# Patient Record
Sex: FEMALE | Race: WHITE | Employment: FULL TIME | URBAN - METROPOLITAN AREA
[De-identification: names, ages, dates, MRNs, and addresses within clinical notes are randomized per-mention and may not be internally consistent; named-entity substitution may affect disease eponyms.]

---

## 2018-07-05 ENCOUNTER — OFFICE VISIT (OUTPATIENT)
Dept: DERMATOLOGY | Age: 38
End: 2018-07-05

## 2018-07-05 DIAGNOSIS — L68.9 EXCESSIVE HAIR: Primary | ICD-10-CM

## 2018-07-05 DIAGNOSIS — Z41.1 ELECTIVE PROCEDURE FOR UNACCEPTABLE COSMETIC APPEARANCE: ICD-10-CM

## 2018-07-05 PROCEDURE — DM01500 PIGMENTED LASER 1 LESION: Performed by: DERMATOLOGY

## 2018-07-05 PROCEDURE — DM01530 PIGMENTED LASER LARGE AREA (EG. CHEST, ARMS, NECK, SHOULDERS): Performed by: DERMATOLOGY

## 2018-07-05 RX ORDER — DICLOFENAC SODIUM 75 MG/1
TABLET, DELAYED RELEASE ORAL
COMMUNITY
Start: 2018-06-28 | End: 2019-08-01

## 2018-07-05 NOTE — PROGRESS NOTES
[facility]  Manju Olivo MD  [phone]      Wilton Hicks  1980    40 y.o. female     Date of Visit: 7/5/2018    Last seen: new; mom is a patient - Peri Ramírez    Chief Complaint: hair removal  Chief Complaint   Patient presents with    Laser Treatment     NP-bikini, axillas and chin     History of Present Illness:    Here for elective hair removal in the axilla, bikini and chin. No previous trx. Typically shaves; no recent pluxking or waxing. Not tan. 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 Past Medical History, Family History, Surgical History, Medications and Allergies reviewed. Outpatient Prescriptions Marked as Taking for the 7/5/18 encounter (Office Visit) with Heidi Griffin MD   Medication Sig Dispense Refill    diclofenac (VOLTAREN) 75 MG EC tablet        Allergies   Allergen Reactions    Sulfa Antibiotics Other (See Comments)     Patient states sulfer and sulfa, as an infant caused her to turn blue       Past Medical History:   Diagnosis Date    Pyloric stenosis 1982     History reviewed. No pertinent surgical history. Physical Examination     Gen, well-appearing  Axilla, bikini and chin with dark brown hairs    Assessment and Plan     1.  Excess hair - chin, bikini and axilla  - laser as below  Laser Procedure Note       Wyn Blinks OF BIRTH:  1980    DATE OF VISIT:  7/5/2018  Chief Complaint   Patient presents with    Laser Treatment     NP-bikini, axillas and chin     LASER: GentleLase #1  DIAGNOSIS: excess hair - bikini, axilla, chin    We discussed treatment options, including no treatment as well as the following:  - need for multiple treatments and risk of incomplete clearance, recurrence  - risk of erythema, edema, purpura, dyspigmentation and scarring    PATIENT IDENTIFIED PER PROTOCOL: yes  LOCATION(S): axilla, bikini, chin  VERIFIED AND MARKED: yes  TECHNIQUES, RISKS, BENEFITS AND ALTERNATIVES EXPLAINED: yes  CONSENT SIGNED, WITNESSED AND DATED: yes        OPERATIVE REPORT    DIAGNOSIS, LOCATION, PROCEDURE RECONFIRMED: yes   EYE PROTECTION: yes  ANESTHESIA/PRE-OP MEDICATIONS: none  LASER SETTINGS:  (1)  WAVELENGTH: 755 - GentleLase  LENS: 18 mm  FLUENCE: 18 J  COOLIN/40    PROCEDURE NOTE:  Affected areas treated with single and focal double pulses with vaporization of hairs and mild erythema.     POST-OPERATIVE CARE/DISPOSITION: cool compresses prn  COMPLICATIONS: none  MEDICATIONS: none  WOUND CARE INSTRUCTIONS PROVIDED: yes    F/u 1-2 mos        200 - azilla  200 - bikini  100 - chin    500 total

## 2018-07-18 ENCOUNTER — TELEPHONE (OUTPATIENT)
Dept: DERMATOLOGY | Age: 38
End: 2018-07-18

## 2018-07-18 NOTE — TELEPHONE ENCOUNTER
Lee Rojas was in on 7/5 for laser hair removal. She has a few questions on when to follow up. Should she do every month or wait for more hair growth? You can reach her at [phone].

## 2018-07-24 ENCOUNTER — TELEPHONE (OUTPATIENT)
Dept: DERMATOLOGY | Age: 38
End: 2018-07-24

## 2018-09-06 ENCOUNTER — PROCEDURE VISIT (OUTPATIENT)
Dept: DERMATOLOGY | Age: 38
End: 2018-09-06

## 2018-09-06 DIAGNOSIS — Z41.1 ELECTIVE PROCEDURE FOR UNACCEPTABLE COSMETIC APPEARANCE: ICD-10-CM

## 2018-09-06 DIAGNOSIS — L68.9 EXCESSIVE HAIR: Primary | ICD-10-CM

## 2018-09-06 PROCEDURE — DM01320 VBEAM LASER LARGE OR 4 AREAS/ FULL FACE ROSACEA: Performed by: DERMATOLOGY

## 2018-09-06 PROCEDURE — DM01310 VBEAM LASER SMALL OR 2 AREAS: Performed by: DERMATOLOGY

## 2018-11-01 ENCOUNTER — PROCEDURE VISIT (OUTPATIENT)
Dept: DERMATOLOGY | Age: 38
End: 2018-11-01

## 2018-11-01 DIAGNOSIS — Z41.1 ELECTIVE PROCEDURE FOR UNACCEPTABLE COSMETIC APPEARANCE: ICD-10-CM

## 2018-11-01 DIAGNOSIS — L68.9 EXCESSIVE HAIR: Primary | ICD-10-CM

## 2018-11-01 PROCEDURE — DM01300 VBEAM LASER EXTRA SMALL AREA: Performed by: DERMATOLOGY

## 2018-11-01 PROCEDURE — DM01320 VBEAM LASER LARGE OR 4 AREAS/ FULL FACE ROSACEA: Performed by: DERMATOLOGY

## 2018-11-01 NOTE — PROGRESS NOTES
[facility]  Yamile Das MD  [phone]      Servando Boyce  1980    40 y.o. female     Date of Visit: 11/1/2018    Last seen: 9-2018; mom is a patient - Becky De La Vega    Chief Complaint: hair removal  Chief Complaint   Patient presents with   [address] area and both underarms     History of Present Illness:    Here for elective hair removal in the axilla, bikini. Chin also treated x 2 but minimal growth today. 2 previous trx with improvement. No complications. Typically shaves; no recent pluxking or waxing. Not tan. 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 Past Medical History, Family History, Surgical History, Medications and Allergies reviewed. No outpatient prescriptions have been marked as taking for the 11/1/18 encounter (Procedure visit) with Waleska Alatorre MD.     Allergies   Allergen Reactions    Sulfa Antibiotics Other (See Comments)     Patient states sulfer and sulfa, as an infant caused her to turn blue       Past Medical History:   Diagnosis Date    Pyloric stenosis 1982     No past surgical history on file. Physical Examination     Gen, well-appearing  Axilla, bikini with dark brown hairs    Assessment and Plan     1.  Excess hair - chin, bikini and axilla  - laser as below  Laser Procedure Note       Daphne Gupta OF BIRTH:  1980    DATE OF VISIT:  11/1/2018  Chief Complaint   Patient presents with   [address] area and both underarms     LASER: GentleLase #3  DIAGNOSIS: excess hair - bikini, axilla, few on the chin    We discussed treatment options, including no treatment as well as the following:  - need for multiple treatments and risk of incomplete clearance, recurrence  - risk of erythema, edema, purpura, dyspigmentation and scarring    PATIENT IDENTIFIED PER PROTOCOL: yes  LOCATION(S): axilla, bikini, chin  VERIFIED AND

## 2019-03-07 ENCOUNTER — PROCEDURE VISIT (OUTPATIENT)
Dept: DERMATOLOGY | Age: 39
End: 2019-03-07

## 2019-03-07 DIAGNOSIS — L68.9 EXCESSIVE HAIR: Primary | ICD-10-CM

## 2019-03-07 DIAGNOSIS — Z41.1 ELECTIVE PROCEDURE FOR UNACCEPTABLE COSMETIC APPEARANCE: ICD-10-CM

## 2019-03-07 PROCEDURE — DM01525 PIGMENTED LASER MEDIUM AREA (EG. FOREARMS): Performed by: DERMATOLOGY

## 2019-08-01 ENCOUNTER — PROCEDURE VISIT (OUTPATIENT)
Dept: DERMATOLOGY | Age: 39
End: 2019-08-01

## 2019-08-01 DIAGNOSIS — L68.9 EXCESSIVE HAIR: Primary | ICD-10-CM

## 2019-08-01 DIAGNOSIS — Z41.1 ELECTIVE PROCEDURE FOR UNACCEPTABLE COSMETIC APPEARANCE: ICD-10-CM

## 2019-08-01 PROCEDURE — DM01525 PIGMENTED LASER MEDIUM AREA (EG. FOREARMS): Performed by: DERMATOLOGY

## 2019-08-01 RX ORDER — CETIRIZINE HYDROCHLORIDE 10 MG/1
10 TABLET ORAL
COMMUNITY
Start: 2018-01-02

## 2019-08-01 NOTE — PROGRESS NOTES
and scarring    PATIENT IDENTIFIED PER PROTOCOL: yes  LOCATION(S): axilla, bikini, chin  VERIFIED AND MARKED: yes  TECHNIQUES, RISKS, BENEFITS AND ALTERNATIVES EXPLAINED: yes  CONSENT SIGNED, WITNESSED AND DATED: yes        OPERATIVE REPORT    DIAGNOSIS, LOCATION, PROCEDURE RECONFIRMED: yes   EYE PROTECTION: yes  ANESTHESIA/PRE-OP MEDICATIONS: none  LASER SETTINGS:  (1)  WAVELENGTH: 755 - GentleLase  LENS: 15 mm  FLUENCE: 30 J COOLIN/40    PROCEDURE NOTE:  Affected areas treated with single and focal double pulses with vaporization of hairs and mild erythema.   Few strips on the anterior/medial shins treated as test areas as well.  18, 20 useed for the chin hair    POST-OPERATIVE CARE/DISPOSITION: cool compresses prn  COMPLICATIONS: none  MEDICATIONS: none  WOUND CARE INSTRUCTIONS PROVIDED: yes    F/u 1-2 mos        Quoted:  200 - axilla  200 - bikini    300 total (decreased d/t fewer hairs in the bikini)    *ed likely 100-200 for smaller areas for maintenance    * add soolantra for perioral derm - call if no improvemena and will do paige/doxy if needed

## 2019-08-29 ENCOUNTER — TELEPHONE (OUTPATIENT)
Dept: DERMATOLOGY | Age: 39
End: 2019-08-29

## 2019-08-29 RX ORDER — MINOCYCLINE HYDROCHLORIDE 50 MG/1
CAPSULE ORAL
Qty: 60 CAPSULE | Refills: 1 | Status: SHIPPED | OUTPATIENT
Start: 2019-08-29 | End: 2019-12-05

## 2019-08-29 NOTE — TELEPHONE ENCOUNTER
I sent in the minocycline to start bid if she is OK with taking a pill. This is usually well-tolerated but can sometimes cause upset stomach, typically better tolerated if takes with food. If she has any other side effects including headache, vision changes, rash - let us know.

## 2019-12-05 ENCOUNTER — PROCEDURE VISIT (OUTPATIENT)
Dept: DERMATOLOGY | Age: 39
End: 2019-12-05

## 2019-12-05 DIAGNOSIS — Z41.1 ELECTIVE PROCEDURE FOR UNACCEPTABLE COSMETIC APPEARANCE: ICD-10-CM

## 2019-12-05 DIAGNOSIS — L68.9 EXCESSIVE HAIR: Primary | ICD-10-CM

## 2019-12-05 PROCEDURE — DM01530 PIGMENTED LASER LARGE AREA (EG. CHEST, ARMS, NECK, SHOULDERS): Performed by: DERMATOLOGY

## 2019-12-11 ENCOUNTER — TELEPHONE (OUTPATIENT)
Dept: DERMATOLOGY | Age: 39
End: 2019-12-11

## 2020-03-05 ENCOUNTER — PROCEDURE VISIT (OUTPATIENT)
Dept: DERMATOLOGY | Age: 40
End: 2020-03-05

## 2020-03-05 PROCEDURE — DM01510 PIGMENTED LASER 6-10 LESIONS: Performed by: DERMATOLOGY

## 2020-03-05 PROCEDURE — DM01505 PIGMENTED LASER 2-5 LESIONS: Performed by: DERMATOLOGY

## 2020-03-05 RX ORDER — TRETINOIN 0.5 MG/G
CREAM TOPICAL
COMMUNITY

## 2020-03-05 NOTE — PROGRESS NOTES
[facility]  Rena Lyman MD  [phone]      Chip Pro  1980    46 y.o. female     Date of Visit: 3/5/2020    Last seen: ; mom is a patient - Crockett Luiza    Chief Complaint: hair removal  Chief Complaint   Patient presents with    Procedure     Laser-bilateal legs, axilla     History of Present Illness:    Here for elective hair removal in the axilla, bikini - much better. Chin also treated x 4 with focal growth today. 6 previous trx with improvement. No complications. Would also like lower legs treated today - 2nd trx. Typically shaves; no recent pluxking or waxing. Not tan. *hx of dermatographism - took claritin in the past with trx    Review of Systems:  Gen: Feels well, good sense of health. Past Medical History, Family History, Surgical History, Medications and Allergies reviewed. Outpatient Medications Marked as Taking for the 3/5/20 encounter (Procedure visit) with Fernanda Blanchard MD   Medication Sig Dispense Refill    tretinoin (RETIN-A) 0.05 % cream tretinoin 0.05 % topical cream   APPLY TO THE AFFECTED AREA(S) BY TOPICAL ROUTE ONCE DAILY AT BEDTIME      cetirizine (ZYRTEC) 10 MG tablet Take 10 mg by mouth       Allergies   Allergen Reactions    Sulfa Antibiotics Other (See Comments)     Patient states sulfer and sulfa, as an infant caused her to turn blue       Past Medical History:   Diagnosis Date    Pyloric stenosis 1982     No past surgical history on file. Physical Examination     Gen, well-appearing  Axilla, bikini with dark brown hairs  Legs with brown hairs    Assessment and Plan     1.  Excess hair - chin, bikini and axilla  - laser as below  Laser Procedure Note       Olga Finger OF BIRTH:  1980    DATE OF VISIT:  3/5/2020  Chief Complaint   Patient presents with    Procedure     Laser-bilateal legs, axilla     LASER: GentleLasraquel #6+ - bikini, axilla - focal areas  #4 - chin  #2 - lower legs    DIAGNOSIS: excess hair - bikini, axilla, few on the chin; would like shins treated as well    We discussed treatment options, including no treatment as well as the following:  - need for multiple treatments and risk of incomplete clearance, recurrence  - risk of erythema, edema, purpura, dyspigmentation and scarring    PATIENT IDENTIFIED PER PROTOCOL: yes  LOCATION(S): axilla, bikini, chin, shins  VERIFIED AND MARKED: yes  TECHNIQUES, RISKS, BENEFITS AND ALTERNATIVES EXPLAINED: yes  CONSENT SIGNED, WITNESSED AND DATED: yes        OPERATIVE REPORT    DIAGNOSIS, LOCATION, PROCEDURE RECONFIRMED: yes   EYE PROTECTION: yes  ANESTHESIA/PRE-OP MEDICATIONS: none  LASER SETTINGS:  (1)  WAVELENGTH: 755 - GentleLase  LENS: 15 mm  FLUENCE: 30 J COOLIN/40     *18 mm, 20 J for the lower legs and chin    PROCEDURE NOTE:  Affected areas treated with single and focal double pulses with vaporization of hairs and mild erythema.   Few strips on the anterior/medial shins treated as test areas as well.  18, 20 useed for the chin hair and legs    POST-OPERATIVE CARE/DISPOSITION: cool compresses prn  COMPLICATIONS: none  MEDICATIONS: none  WOUND CARE INSTRUCTIONS PROVIDED: yes    F/u 1-2 mos        Quoted:  200 - axilla  200 - bikini     350 total (decreased d/t fewer hairs in the bikini/axilla; legs added)    *ed likely 100-200/area for smaller areas for maintenance

## 2020-10-01 ENCOUNTER — PROCEDURE VISIT (OUTPATIENT)
Dept: DERMATOLOGY | Age: 40
End: 2020-10-01

## 2020-10-01 VITALS — TEMPERATURE: 98.8 F

## 2020-10-01 PROCEDURE — DM01530 PIGMENTED LASER LARGE AREA (EG. CHEST, ARMS, NECK, SHOULDERS): Performed by: DERMATOLOGY

## 2020-10-01 NOTE — PROGRESS NOTES
[facility]  Baron Lara MD  [phone]      Sierra Mcclure  1980    46 y.o. female     Date of Visit: 10/1/2020    Last seen: 3-2020; mom is a patient - Heath Jessica    Chief Complaint: hair removal  Chief Complaint   Patient presents with    Procedure     Laser     History of Present Illness:    Here for elective hair removal in the axilla, bikini - much better. Chin also treated x 4 with focal growth today. 6 previous trx with improvement. No complications. Would also like lower legs treated today - 3rd trx. Typically shaves; no recent pluxking or waxing. Not tan. *hx of dermatographism - took claritin in the past with trx    Review of Systems:  Gen: Feels well, good sense of health. Past Medical History, Family History, Surgical History, Medications and Allergies reviewed. Outpatient Medications Marked as Taking for the 10/1/20 encounter (Procedure visit) with Denise Cole MD   Medication Sig Dispense Refill    tretinoin (RETIN-A) 0.05 % cream tretinoin 0.05 % topical cream   APPLY TO THE AFFECTED AREA(S) BY TOPICAL ROUTE ONCE DAILY AT BEDTIME      cetirizine (ZYRTEC) 10 MG tablet Take 10 mg by mouth       Allergies   Allergen Reactions    Sulfa Antibiotics Other (See Comments)     Patient states sulfer and sulfa, as an infant caused her to turn blue       Past Medical History:   Diagnosis Date    Pyloric stenosis 1982     No past surgical history on file. Physical Examination     Gen, well-appearing  Legs with brown hairs    Assessment and Plan     1.  Excess hair - chin, bikini touch up and legs  - laser as below  Laser Procedure Note       Marine Ortiz OF BIRTH:  1980    DATE OF VISIT:  10/1/2020  Chief Complaint   Patient presents with    Procedure     Laser     LASER: GentleLase #6+ - bikini, axilla - focal areas  #5 - chin  #3 - lower legs    DIAGNOSIS: excess hair - bikini, axilla, few on the chin; would like shins treated as well    We discussed treatment options, including no treatment as well as the following:  - need for multiple treatments and risk of incomplete clearance, recurrence  - risk of erythema, edema, purpura, dyspigmentation and scarring    PATIENT IDENTIFIED PER PROTOCOL: yes  LOCATION(S): axilla, bikini, chin, shins  VERIFIED AND MARKED: yes  TECHNIQUES, RISKS, BENEFITS AND ALTERNATIVES EXPLAINED: yes  CONSENT SIGNED, WITNESSED AND DATED: yes        OPERATIVE REPORT    DIAGNOSIS, LOCATION, PROCEDURE RECONFIRMED: yes   EYE PROTECTION: yes  ANESTHESIA/PRE-OP MEDICATIONS: none  LASER SETTINGS:  (1)  WAVELENGTH: 755 - GentleLase  LENS: 15 mm  FLUENCE: 30 J COOLIN/40     *18 mm, 20 J for the lower legs and chin today    PROCEDURE NOTE:  Affected areas treated with single and focal double pulses with vaporization of hairs and mild erythema.   18, 20 useed for the chin hair and legs today, not 15,30 this time    POST-OPERATIVE CARE/DISPOSITION: cool compresses prn  COMPLICATIONS: none  MEDICATIONS: none  WOUND CARE INSTRUCTIONS PROVIDED: yes    F/u 1-2 mos        Quoted:  200 - axilla  200 - bikini     400 total (decreased to 350 in the past d/t fewer hairs in the bikini/axilla when treated previously; higher today b/c more legs added)    *ed likely 100-200/area for smaller areas for maintenance    Ed microneedling for the cheeks - 200/trx

## 2020-10-01 NOTE — PATIENT INSTRUCTIONS
Tretinoin/retinol product in the PM   - can do prescription retin a or any over the counter retinol    Vitamin C serum    - C serum 22   - drunk elephant   - the ordinary    Elta MD UV CLear - good daily moisturizer with SPF

## 2021-12-03 ENCOUNTER — TELEPHONE (OUTPATIENT)
Dept: DERMATOLOGY | Age: 41
End: 2021-12-03

## 2021-12-03 NOTE — TELEPHONE ENCOUNTER
Pt of    Pt stated she looking to get schedule for laser for ance scar? She wasn't sure which one specifically. Her and  had discussed in the past.   Please advise, Thank you!

## 2021-12-10 ENCOUNTER — TELEPHONE (OUTPATIENT)
Dept: DERMATOLOGY | Age: 41
End: 2021-12-10

## 2022-02-01 ENCOUNTER — PROCEDURE VISIT (OUTPATIENT)
Dept: DERMATOLOGY | Age: 42
End: 2022-02-01

## 2022-02-01 VITALS — TEMPERATURE: 97.6 F

## 2022-02-01 DIAGNOSIS — L73.0 ACNE SCAR: Primary | ICD-10-CM

## 2022-02-01 PROCEDURE — 99999 PR OFFICE/OUTPT VISIT,PROCEDURE ONLY: CPT | Performed by: DERMATOLOGY

## 2022-02-01 RX ORDER — MULTIVIT-MIN/IRON/FOLIC ACID/K 18-600-40
CAPSULE ORAL
COMMUNITY

## 2022-02-01 NOTE — PROGRESS NOTES
[facility]  Donna Hodges MD  [phone]      Anabell Juarez  1980    37 y.o. female     Date of Visit: 2/1/2022    Last seen:     Chief Complaint: scars  Chief Complaint   Patient presents with    Procedure     vbeam      History of Present Illness:    Here for trx of acne scars on the cheeks. No previous trx. Had briefly discussed laser and MN in the past - scheduled for laser today but only Vbeam in office today. *hx of dermatographism - took claritin in the past with trx    Review of Systems:  Gen: Feels well, good sense of health. Past Medical History, Family History, Surgical History, Medications and Allergies reviewed. Outpatient Medications Marked as Taking for the 2/1/22 encounter (Procedure visit) with Erna Coronel MD   Medication Sig Dispense Refill    Cholecalciferol (VITAMIN D) 50 MCG (2000 UT) CAPS capsule Take by mouth      tretinoin (RETIN-A) 0.05 % cream tretinoin 0.05 % topical cream   APPLY TO THE AFFECTED AREA(S) BY TOPICAL ROUTE ONCE DAILY AT BEDTIME      cetirizine (ZYRTEC) 10 MG tablet Take 10 mg by mouth       Allergies   Allergen Reactions    Sulfa Antibiotics Other (See Comments)     Patient states sulfer and sulfa, as an infant caused her to turn blue       Past Medical History:   Diagnosis Date    Pyloric stenosis 1982     History reviewed. No pertinent surgical history. Physical Examination     Gen, well-appearing                Assessment and Plan     Acne scars - no erythema, mainly textural/rolling  - rtn for Quadralase or MN - discussed options    NC today - rescheduled for tomorrow.

## 2022-02-02 ENCOUNTER — PROCEDURE VISIT (OUTPATIENT)
Dept: DERMATOLOGY | Age: 42
End: 2022-02-02

## 2022-02-02 VITALS — TEMPERATURE: 97.3 F

## 2022-02-02 DIAGNOSIS — L73.0 ACNE SCAR: Primary | ICD-10-CM

## 2022-02-02 PROCEDURE — DM01515 PIGMENTED LASER 11-20 LESIONS: Performed by: DERMATOLOGY

## 2022-02-02 NOTE — PROGRESS NOTES
[facility]  Ashley Sutton MD  [phone]      Baron Moreira  1980    37 y.o. female     Date of Visit: 2/2/2022    Last seen: 3-2020; mom is a patient - Juanito Connell    Chief Complaint: hair removal  Chief Complaint   Patient presents with    Laser Treatment     History of Present Illness:    Here for treatment of textural changes/acne scars on the cheeks. No significant erythema or telangiectasias, mainly just rolling scars. No previous treatment    Has had laser hair removal previously without complication. Not tan. *hx of dermatographism - took claritin in the past with trx    Review of Systems:  Gen: Feels well, good sense of health. Past Medical History, Family History, Surgical History, Medications and Allergies reviewed. Outpatient Medications Marked as Taking for the 2/2/22 encounter (Procedure visit) with Ana Jolly MD   Medication Sig Dispense Refill    Cholecalciferol (VITAMIN D) 50 MCG (2000 UT) CAPS capsule Take by mouth      tretinoin (RETIN-A) 0.05 % cream tretinoin 0.05 % topical cream   APPLY TO THE AFFECTED AREA(S) BY TOPICAL ROUTE ONCE DAILY AT BEDTIME      cetirizine (ZYRTEC) 10 MG tablet Take 10 mg by mouth       Allergies   Allergen Reactions    Sulfa Antibiotics Other (See Comments)     Patient states sulfer and sulfa, as an infant caused her to turn blue       Past Medical History:   Diagnosis Date    Pyloric stenosis 1982     History reviewed. No pertinent surgical history. Physical Examination     Gen, well-appearing    Baseline photos           Assessment and Plan     1.   Acne scars  - Discussed microneedling and CO2 laser-elected to proceed with laser today  Herb villa      Laser Procedure Note       Baron Moreira   YOB: 1980    DATE OF VISIT:  2/2/2022  Chief Complaint   Patient presents with    Laser Treatment     LASER: Fadi Cleveland #1    DIAGNOSIS: Acne scars    We discussed treatment options, including no treatment as well as the following:  - need for multiple treatments and risk of incomplete clearance, recurrence  - risk of erythema, edema, purpura, dyspigmentation and scarring    PATIENT IDENTIFIED PER PROTOCOL: yes  LOCATION(S): Cheeks  VERIFIED AND MARKED: yes  TECHNIQUES, RISKS, BENEFITS AND ALTERNATIVES EXPLAINED: yes  CONSENT SIGNED, WITNESSED AND DATED: yes        OPERATIVE REPORT    DIAGNOSIS, LOCATION, PROCEDURE RECONFIRMED: yes   EYE PROTECTION: yes  ANESTHESIA/PRE-OP MEDICATIONS: LMX  LASER SETTINGS:  (1)  WAVELENGTH: CO2 - QuadraLase     12 W, 25% density    PROCEDURE NOTE:  LMX applied x 20 min and then removed completely, cleansed with alcohol pad  Then cheeks treated with single pass with above setting (R cheek initially treated with 10-11 W with very mild erythema so increased to 15 W with more appropriate response)    POST-OPERATIVE CARE/DISPOSITION: cool compresses and aquaphor prn, gentle cleansing  COMPLICATIONS: none  MEDICATIONS: none  WOUND CARE INSTRUCTIONS PROVIDED: yes    F/u 1-2 mos        250     Can potentially alt with MN trx - 200-250.

## 2022-02-07 ENCOUNTER — TELEPHONE (OUTPATIENT)
Dept: DERMATOLOGY | Age: 42
End: 2022-02-07

## 2022-02-07 NOTE — TELEPHONE ENCOUNTER
Pt  Of Arnaldo Tolentino   Pt call and stated she has a procedure done last week, and that it has healed fine but she has a few questions for [facility] that she would like to ask her directly. Please call back to discuss.    C/B#  [phone]

## 2022-02-07 NOTE — TELEPHONE ENCOUNTER
It's no a bad thing that she healed fairly quickly. I always hope that people anticipate the worst and are then pleasantly surprised if it's not as bad or as long as they expected. We started at a lower - moderate setting, so we can definitely go up with subsequent treatments. Some people can't tolerate the settings that I used on her as well as it sounds like she did. We could potentially repeat laser in March since she did so well.

## 2022-02-07 NOTE — TELEPHONE ENCOUNTER
Marlen Solorio on 2/2/2022. Patient experienced redness, scabbing after Quadralase treatment on 2/2/2022. But, today (day 5) patient's face looks completely fine, all scabbing is gone along with redness. Patient questioning is that normal to be finish with after affects of laser so soon?

## 2022-03-02 ENCOUNTER — PROCEDURE VISIT (OUTPATIENT)
Dept: DERMATOLOGY | Age: 42
End: 2022-03-02

## 2022-03-02 VITALS — TEMPERATURE: 97.7 F

## 2022-03-02 DIAGNOSIS — L73.0 ACNE SCAR: Primary | ICD-10-CM

## 2022-03-02 PROCEDURE — DM01515 PIGMENTED LASER 11-20 LESIONS: Performed by: DERMATOLOGY

## 2022-03-02 NOTE — PROGRESS NOTES
[facility]  Hamida Man MD  [phone]      Pato Cummins  1980    37 y.o. female     Date of Visit: 3/2/2022    Last seen: 3-2020; mom is a patient - Ann Marie Rojas    Chief Complaint: textural changes  Chief Complaint   Patient presents with    Laser Treatment     History of Present Illness:    Here for f/u and treatment of textural changes/acne scars on the cheeks. Mainly just rolling scars. 1 previous treatment 1 month ago - healed very quickly and no complications. Improvement on photos already. Feels she may be more erythematous in some areas recently. Has had laser hair removal previously without complication. Not tan. *hx of dermatographism - took claritin in the past with trx    Review of Systems:  Gen: Feels well, good sense of health. Past Medical History, Family History, Surgical History, Medications and Allergies reviewed. Outpatient Medications Marked as Taking for the 3/2/22 encounter (Procedure visit) with Tone Spencer MD   Medication Sig Dispense Refill    Cholecalciferol (VITAMIN D) 50 MCG (2000 UT) CAPS capsule Take by mouth      tretinoin (RETIN-A) 0.05 % cream tretinoin 0.05 % topical cream   APPLY TO THE AFFECTED AREA(S) BY TOPICAL ROUTE ONCE DAILY AT BEDTIME      cetirizine (ZYRTEC) 10 MG tablet Take 10 mg by mouth       Allergies   Allergen Reactions    Sulfa Antibiotics Other (See Comments)     Patient states sulfer and sulfa, as an infant caused her to turn blue       Past Medical History:   Diagnosis Date    Pyloric stenosis 1982     History reviewed. No pertinent surgical history. Physical Examination     Gen, well-appearing    Baseline photos 2-2022 and 1 month after 3-2022                  Assessment and Plan     1.   Acne scars  - Discussed microneedling and CO2 laser-elected to proceed with laser today  Donna villa      Laser Procedure Note       Pato Cummins   YOB: 1980    DATE OF VISIT: 3/2/2022  Chief Complaint   Patient presents with    Laser Treatment     LASER: Abdullahi Thomas #2    DIAGNOSIS: Acne scars/textural changes    We discussed treatment options, including no treatment as well as the following:  - need for multiple treatments and risk of incomplete clearance, recurrence  - risk of erythema, edema, purpura, dyspigmentation and scarring    PATIENT IDENTIFIED PER PROTOCOL: yes  LOCATION(S): Cheeks  VERIFIED AND MARKED: yes  TECHNIQUES, RISKS, BENEFITS AND ALTERNATIVES EXPLAINED: yes  CONSENT SIGNED, WITNESSED AND DATED: yes        OPERATIVE REPORT    DIAGNOSIS, LOCATION, PROCEDURE RECONFIRMED: yes   EYE PROTECTION: yes  ANESTHESIA/PRE-OP MEDICATIONS: LMX  LASER SETTINGS:  (1)  WAVELENGTH: CO2 - QuadraLase     15 W (increased from 15 W), 25% density  *laser recently recalibrated/serviced as well son potentially even higher intensity than last trx but d/w Gabriele and would like potentially more aggressive trx before spring    PROCEDURE NOTE:  LMX applied x 20 min and then removed completely, cleansed with alcohol pad  Then cheeks treated with single pass with above setting     POST-OPERATIVE CARE/DISPOSITION: cool compresses and aquaphor prn, gentle cleansing  COMPLICATIONS: none  MEDICATIONS: none  WOUND CARE INSTRUCTIONS PROVIDED: yes    F/u 1-2 mos        250     Can potentially alt with MN trx - 200-250.

## 2023-05-03 ENCOUNTER — PATIENT MESSAGE (OUTPATIENT)
Dept: DERMATOLOGY | Age: 43
End: 2023-05-03

## 2023-05-09 NOTE — TELEPHONE ENCOUNTER
From: Josy Avila  To: Dr. Camilo Vitale: 5/3/2023 12:40 PM EDT  Subject: Can you or Sho Saucedo see my  ? Hi Dr. Nalini Avery! I know you briefly spoke to my dad about trying to get my  into your office. I would like him to see you or your  about a general check up (he has lots of moles) and he has some tags he would like removed. 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 So if possible, we would like to take any cancellation appointments that are that far out.  No immediate rush, but would love to be on the waitlist.

## 2023-05-10 NOTE — TELEPHONE ENCOUNTER
Called and left message for patient to call back office. Left message advising patient that her  can be scheduled with Dr Tabatha Montoya for his next available which is currently October. I do not have patient's information, so I could not document in his chart.